# Patient Record
Sex: MALE | Race: WHITE | NOT HISPANIC OR LATINO | Employment: OTHER | URBAN - NONMETROPOLITAN AREA
[De-identification: names, ages, dates, MRNs, and addresses within clinical notes are randomized per-mention and may not be internally consistent; named-entity substitution may affect disease eponyms.]

---

## 2024-07-22 ENCOUNTER — OFFICE VISIT (OUTPATIENT)
Dept: CARDIOLOGY | Facility: CLINIC | Age: 69
End: 2024-07-22
Payer: MEDICARE

## 2024-07-22 VITALS
HEART RATE: 93 BPM | OXYGEN SATURATION: 96 % | SYSTOLIC BLOOD PRESSURE: 130 MMHG | HEIGHT: 71 IN | BODY MASS INDEX: 27.87 KG/M2 | DIASTOLIC BLOOD PRESSURE: 86 MMHG | WEIGHT: 199.1 LBS

## 2024-07-22 DIAGNOSIS — F51.5 NIGHTMARES: ICD-10-CM

## 2024-07-22 DIAGNOSIS — G47.10 HYPERSOMNIA: Primary | ICD-10-CM

## 2024-07-22 PROBLEM — G47.00 INSOMNIA: Status: ACTIVE | Noted: 2020-10-05

## 2024-07-22 PROCEDURE — 99204 OFFICE O/P NEW MOD 45 MIN: CPT | Performed by: NURSE PRACTITIONER

## 2024-07-22 PROCEDURE — 1160F RVW MEDS BY RX/DR IN RCRD: CPT | Performed by: NURSE PRACTITIONER

## 2024-07-22 PROCEDURE — 1159F MED LIST DOCD IN RCRD: CPT | Performed by: NURSE PRACTITIONER

## 2024-07-22 RX ORDER — MULTIPLE VITAMINS W/ MINERALS TAB 9MG-400MCG
1 TAB ORAL DAILY
COMMUNITY

## 2024-07-22 RX ORDER — GEMFIBROZIL 600 MG/1
1 TABLET, FILM COATED ORAL EVERY 12 HOURS SCHEDULED
COMMUNITY
Start: 2024-04-30

## 2024-07-22 RX ORDER — LOSARTAN POTASSIUM 50 MG/1
1 TABLET ORAL DAILY
COMMUNITY
Start: 2024-06-24

## 2024-07-22 RX ORDER — PRAVASTATIN SODIUM 20 MG
TABLET ORAL
COMMUNITY
Start: 2024-07-21

## 2024-07-22 RX ORDER — OMEPRAZOLE 20 MG/1
CAPSULE, DELAYED RELEASE ORAL
COMMUNITY
Start: 2024-07-20

## 2024-07-22 RX ORDER — AMLODIPINE BESYLATE 5 MG/1
1 TABLET ORAL DAILY
COMMUNITY
Start: 2024-07-10

## 2024-07-22 RX ORDER — TRAZODONE HYDROCHLORIDE 50 MG/1
TABLET ORAL
COMMUNITY
Start: 2024-07-02 | End: 2024-07-22 | Stop reason: ALTCHOICE

## 2024-07-22 RX ORDER — IMIPRAMINE HCL 50 MG
TABLET ORAL
COMMUNITY
Start: 2024-05-22

## 2024-07-22 RX ORDER — SILDENAFIL 100 MG/1
1 TABLET, FILM COATED ORAL DAILY
COMMUNITY
Start: 2024-07-02
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
## 2024-07-22 NOTE — ASSESSMENT & PLAN NOTE
He states that most nights he can go to sleep but does not stay asleep.  He states he has nights where he wakes up and has trouble going back to sleep.  His wife says that he snores loudly and snores in every position.  He says he does have a dog that is sick and he does wake up to care for him.  Patient states he has tried Trazodone for sleep and this made his dreams worse and his sleep did not improve.  He states he has tried ambien in the past and this helped with staying asleep.  Patient complains of daytime sleepiness and fatigue.  He states he falls asleep watching TV and when it is quiet during the day.

## 2024-09-03 DIAGNOSIS — G47.10 HYPERSOMNIA: ICD-10-CM

## 2024-09-03 DIAGNOSIS — F51.5 NIGHTMARES: ICD-10-CM

## 2024-10-07 ENCOUNTER — OFFICE VISIT (OUTPATIENT)
Dept: CARDIOLOGY | Facility: CLINIC | Age: 69
End: 2024-10-07
Payer: MEDICARE

## 2024-10-07 VITALS
OXYGEN SATURATION: 94 % | BODY MASS INDEX: 28 KG/M2 | DIASTOLIC BLOOD PRESSURE: 72 MMHG | HEIGHT: 71 IN | SYSTOLIC BLOOD PRESSURE: 124 MMHG | WEIGHT: 200 LBS | HEART RATE: 98 BPM

## 2024-10-07 DIAGNOSIS — G47.10 HYPERSOMNIA: ICD-10-CM

## 2024-10-07 DIAGNOSIS — G47.52 REM BEHAVIORAL DISORDER: Primary | ICD-10-CM

## 2024-10-07 PROCEDURE — 99213 OFFICE O/P EST LOW 20 MIN: CPT | Performed by: NURSE PRACTITIONER

## 2024-10-07 PROCEDURE — 1160F RVW MEDS BY RX/DR IN RCRD: CPT | Performed by: NURSE PRACTITIONER

## 2024-10-07 PROCEDURE — 1159F MED LIST DOCD IN RCRD: CPT | Performed by: NURSE PRACTITIONER
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
## 2024-10-07 NOTE — ASSESSMENT & PLAN NOTE
He states that most nights he can go to sleep but does not stay asleep. He states he has nights where he wakes up and has trouble going back to sleep. His wife says that he snores loudly and snores in every position. He says he does have a dog that is sick and he does wake up to care for him. Patient states he has tried Trazodone for sleep and this made his dreams worse and his sleep did not improve. He states he has tried ambien in the past and this helped with staying asleep. Patient complains of daytime sleepiness and fatigue. He states he falls asleep watching TV and when it is quiet during the day.     PSG nondiagnostic for sleep apnea.  Sleep efficiency 55.4%
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
